# Patient Record
Sex: FEMALE | Race: WHITE | HISPANIC OR LATINO | URBAN - METROPOLITAN AREA
[De-identification: names, ages, dates, MRNs, and addresses within clinical notes are randomized per-mention and may not be internally consistent; named-entity substitution may affect disease eponyms.]

---

## 2020-06-04 ENCOUNTER — OFFICE VISIT (OUTPATIENT)
Dept: URBAN - METROPOLITAN AREA TELEHEALTH 2 | Facility: TELEHEALTH | Age: 55
End: 2020-06-04

## 2020-07-08 ENCOUNTER — OFFICE VISIT (OUTPATIENT)
Dept: URBAN - METROPOLITAN AREA CLINIC 63 | Facility: CLINIC | Age: 55
End: 2020-07-08

## 2020-07-28 ENCOUNTER — LAB OUTSIDE AN ENCOUNTER (OUTPATIENT)
Dept: URBAN - METROPOLITAN AREA CLINIC 121 | Facility: CLINIC | Age: 55
End: 2020-07-28

## 2020-08-18 LAB
% SATURATION: (no result)
FERRITIN: (no result)
FOLATE, SERUM: (no result)
GASTRIN: (no result)
INTRINSIC FACTOR BLOCKING ANTIBODY: NEGATIVE
IRON BINDING CAPACITY: (no result)
IRON, TOTAL: (no result)
Lab: (no result)
Lab: (no result)
VITAMIN B12: (no result)

## 2020-08-31 ENCOUNTER — OFFICE VISIT (OUTPATIENT)
Dept: URBAN - METROPOLITAN AREA CLINIC 63 | Facility: CLINIC | Age: 55
End: 2020-08-31

## 2020-09-01 ENCOUNTER — OFFICE VISIT (OUTPATIENT)
Dept: URBAN - METROPOLITAN AREA CLINIC 121 | Facility: CLINIC | Age: 55
End: 2020-09-01

## 2020-10-21 ENCOUNTER — OFFICE VISIT (OUTPATIENT)
Dept: URBAN - METROPOLITAN AREA CLINIC 63 | Facility: CLINIC | Age: 55
End: 2020-10-21

## 2020-10-26 ENCOUNTER — OFFICE VISIT (OUTPATIENT)
Dept: URBAN - METROPOLITAN AREA CLINIC 121 | Facility: CLINIC | Age: 55
End: 2020-10-26

## 2021-09-03 ENCOUNTER — OFFICE VISIT (OUTPATIENT)
Dept: URBAN - METROPOLITAN AREA CLINIC 63 | Facility: CLINIC | Age: 56
End: 2021-09-03

## 2021-09-23 ENCOUNTER — OFFICE VISIT (OUTPATIENT)
Dept: URBAN - METROPOLITAN AREA SURGERY CENTER 4 | Facility: SURGERY CENTER | Age: 56
End: 2021-09-23

## 2021-09-27 LAB — PATHOLOGY (INDENTED REPORT): (no result)

## 2021-10-25 ENCOUNTER — OFFICE VISIT (OUTPATIENT)
Dept: URBAN - METROPOLITAN AREA CLINIC 63 | Facility: CLINIC | Age: 56
End: 2021-10-25

## 2022-07-09 ENCOUNTER — TELEPHONE ENCOUNTER (OUTPATIENT)
Dept: URBAN - METROPOLITAN AREA CLINIC 121 | Facility: CLINIC | Age: 57
End: 2022-07-09

## 2022-07-09 RX ORDER — SUCRALFATE 1 G/1
TWICE A DAY TABLET ORAL TWICE A DAY
Refills: 2 | OUTPATIENT
Start: 2020-06-04 | End: 2020-10-21

## 2022-07-09 RX ORDER — OMEPRAZOLE 20 MG/1
TWICE A DAY CAPSULE, DELAYED RELEASE ORAL TWICE A DAY
Refills: 0 | OUTPATIENT
Start: 2020-05-14 | End: 2020-06-04

## 2022-07-09 RX ORDER — SUCRALFATE 1 G/1
TWICE A DAY TABLET ORAL TWICE A DAY
Refills: 2 | OUTPATIENT
Start: 2021-06-09 | End: 2021-09-03

## 2022-07-09 RX ORDER — SUCRALFATE 1 G/1
TWICE A DAY TABLET ORAL TWICE A DAY
Refills: 2 | OUTPATIENT
Start: 2020-10-21 | End: 2021-01-19

## 2022-07-09 RX ORDER — OMEPRAZOLE 20 MG/1
TWICE A DAY CAPSULE, DELAYED RELEASE ORAL TWICE A DAY
Refills: 1 | OUTPATIENT
Start: 2020-06-04 | End: 2020-10-06

## 2022-07-09 RX ORDER — SUCRALFATE 1 G/1
TWICE A DAY TABLET ORAL TWICE A DAY
Refills: 2 | OUTPATIENT
Start: 2021-01-19 | End: 2021-04-16

## 2022-07-09 RX ORDER — OMEPRAZOLE 20 MG/1
CAPSULE, DELAYED RELEASE ORAL
Refills: 0 | OUTPATIENT
Start: 2020-05-13 | End: 2020-05-14

## 2022-07-09 RX ORDER — SUCRALFATE 1 G/1
TWICE A DAY TABLET ORAL TWICE A DAY
Refills: 2 | OUTPATIENT
Start: 2021-04-16 | End: 2021-06-09

## 2022-07-09 RX ORDER — GABAPENTIN 100 MG/1
CAPSULE ORAL
Refills: 0 | OUTPATIENT
Start: 2020-05-13 | End: 2020-05-13

## 2022-07-09 RX ORDER — OMEPRAZOLE 20 MG/1
TWICE A DAY CAPSULE, DELAYED RELEASE ORAL TWICE A DAY
Refills: 0 | OUTPATIENT
Start: 2020-05-14 | End: 2020-05-14

## 2022-07-10 ENCOUNTER — TELEPHONE ENCOUNTER (OUTPATIENT)
Dept: URBAN - METROPOLITAN AREA CLINIC 121 | Facility: CLINIC | Age: 57
End: 2022-07-10

## 2022-07-10 RX ORDER — GABAPENTIN 100 MG/1
CAPSULE ORAL THREE TIMES A DAY
Refills: 0 | Status: ACTIVE | COMMUNITY
Start: 2020-05-13

## 2022-07-10 RX ORDER — ACYCLOVIR 800 MG/1
TABLET ORAL TWICE A DAY
Refills: 0 | Status: ACTIVE | COMMUNITY
Start: 2020-05-13

## 2022-07-10 RX ORDER — SUCRALFATE 1 G/1
TWICE A DAY TABLET ORAL TWICE A DAY
Refills: 2 | Status: ACTIVE | COMMUNITY
Start: 2021-09-03

## 2022-07-10 RX ORDER — GABAPENTIN 100 MG
CAPSULE ORAL
Refills: 0 | Status: ACTIVE | COMMUNITY
Start: 2020-05-13

## 2022-07-10 RX ORDER — FAMOTIDINE 20 MG/1
TWICE A DAY TABLET ORAL TWICE A DAY
Refills: 0 | Status: ACTIVE | COMMUNITY
Start: 2021-09-03

## 2022-07-10 RX ORDER — OMEPRAZOLE 20 MG/1
TWICE A DAY CAPSULE, DELAYED RELEASE ORAL TWICE A DAY
Refills: 0 | Status: ACTIVE | COMMUNITY
Start: 2020-10-06

## 2023-11-01 ENCOUNTER — OFFICE VISIT (OUTPATIENT)
Dept: URBAN - METROPOLITAN AREA CLINIC 60 | Facility: CLINIC | Age: 58
End: 2023-11-01

## 2023-11-01 RX ORDER — ACYCLOVIR 800 MG/1
TABLET ORAL TWICE A DAY
Refills: 0 | Status: ACTIVE | COMMUNITY
Start: 2020-05-13

## 2023-11-01 RX ORDER — FAMOTIDINE 20 MG/1
TWICE A DAY TABLET ORAL TWICE A DAY
Refills: 0 | Status: ACTIVE | COMMUNITY
Start: 2021-09-03

## 2023-11-01 RX ORDER — GABAPENTIN 100 MG
CAPSULE ORAL
Refills: 0 | Status: ACTIVE | COMMUNITY
Start: 2020-05-13

## 2023-11-01 RX ORDER — SUCRALFATE 1 G/1
TWICE A DAY TABLET ORAL TWICE A DAY
Refills: 2 | Status: ACTIVE | COMMUNITY
Start: 2021-09-03

## 2023-11-01 RX ORDER — OMEPRAZOLE 20 MG/1
TWICE A DAY CAPSULE, DELAYED RELEASE ORAL TWICE A DAY
Refills: 0 | Status: ACTIVE | COMMUNITY
Start: 2020-10-06

## 2023-11-01 RX ORDER — GABAPENTIN 100 MG/1
CAPSULE ORAL THREE TIMES A DAY
Refills: 0 | Status: ACTIVE | COMMUNITY
Start: 2020-05-13

## 2023-12-01 ENCOUNTER — DASHBOARD ENCOUNTERS (OUTPATIENT)
Age: 58
End: 2023-12-01

## 2023-12-08 ENCOUNTER — OFFICE VISIT (OUTPATIENT)
Dept: URBAN - METROPOLITAN AREA CLINIC 60 | Facility: CLINIC | Age: 58
End: 2023-12-08

## 2023-12-08 RX ORDER — FAMOTIDINE 20 MG/1
TWICE A DAY TABLET ORAL TWICE A DAY
Refills: 0 | Status: ACTIVE | COMMUNITY
Start: 2021-09-03

## 2023-12-08 RX ORDER — ACYCLOVIR 800 MG/1
TABLET ORAL TWICE A DAY
Refills: 0 | Status: ACTIVE | COMMUNITY
Start: 2020-05-13

## 2023-12-08 RX ORDER — GABAPENTIN 100 MG/1
CAPSULE ORAL THREE TIMES A DAY
Refills: 0 | Status: ACTIVE | COMMUNITY
Start: 2020-05-13

## 2023-12-08 RX ORDER — OMEPRAZOLE 20 MG/1
TWICE A DAY CAPSULE, DELAYED RELEASE ORAL TWICE A DAY
Refills: 0 | Status: ACTIVE | COMMUNITY
Start: 2020-10-06

## 2023-12-08 RX ORDER — GABAPENTIN 100 MG
CAPSULE ORAL
Refills: 0 | Status: ACTIVE | COMMUNITY
Start: 2020-05-13

## 2023-12-08 RX ORDER — SUCRALFATE 1 G/1
TWICE A DAY TABLET ORAL TWICE A DAY
Refills: 2 | Status: ACTIVE | COMMUNITY
Start: 2021-09-03

## 2025-06-20 ENCOUNTER — LAB OUTSIDE AN ENCOUNTER (OUTPATIENT)
Dept: URBAN - METROPOLITAN AREA CLINIC 63 | Facility: CLINIC | Age: 60
End: 2025-06-20

## 2025-06-20 ENCOUNTER — OFFICE VISIT (OUTPATIENT)
Dept: URBAN - METROPOLITAN AREA CLINIC 63 | Facility: CLINIC | Age: 60
End: 2025-06-20
Payer: COMMERCIAL

## 2025-06-20 DIAGNOSIS — K22.70 BARRETT'S ESOPHAGUS WITHOUT DYSPLASIA: ICD-10-CM

## 2025-06-20 DIAGNOSIS — K29.50 UNSPECIFIED CHRONIC GASTRITIS WITHOUT BLEEDING: ICD-10-CM

## 2025-06-20 PROCEDURE — 99204 OFFICE O/P NEW MOD 45 MIN: CPT

## 2025-06-20 RX ORDER — GABAPENTIN 100 MG/1
CAPSULE ORAL
Qty: 90 CAPSULE | Status: ACTIVE | COMMUNITY

## 2025-06-20 RX ORDER — FAMOTIDINE 40 MG/1
1 TABLET TABLET, FILM COATED ORAL ONCE A DAY
Qty: 30 | OUTPATIENT
Start: 2025-06-20

## 2025-06-20 RX ORDER — PROPRANOLOL HYDROCHLORIDE 10 MG/1
TAKE ONE TABLET BY MOUTH TWICE DAILY FOR HEADACHE TABLET ORAL
Qty: 60 EACH | Refills: 0 | Status: ACTIVE | COMMUNITY

## 2025-06-20 RX ORDER — SUCRALFATE 1 G/1
TABLET ORAL
Qty: 60 TABLET | Status: ACTIVE | COMMUNITY

## 2025-06-20 RX ORDER — LANSOPRAZOLE 30 MG/1
CAPSULE, DELAYED RELEASE ORAL
Qty: 30 CAPSULE | Status: ACTIVE | COMMUNITY

## 2025-06-20 RX ORDER — ATORVASTATIN CALCIUM 40 MG/1
TAKE ONE TABLET BY MOUTH DAILY TABLET, FILM COATED ORAL
Qty: 30 EACH | Refills: 0 | Status: ACTIVE | COMMUNITY

## 2025-06-20 RX ORDER — DICYCLOMINE HYDROCHLORIDE 20 MG/1
TAKE ONE TABLET BY MOUTH TWICE DAILY TABLET ORAL
Qty: 60 EACH | Refills: 0 | Status: ACTIVE | COMMUNITY

## 2025-06-20 RX ORDER — CYCLOBENZAPRINE HYDROCHLORIDE 10 MG/1
TAKE ONE TABLET BY MOUTH EVERY NIGHT AT BEDTIME TABLET, FILM COATED ORAL
Qty: 90 EACH | Refills: 0 | Status: ACTIVE | COMMUNITY

## 2025-06-20 NOTE — HPI-TODAY'S VISIT:
6/25 This is a 59 years old female patient with past medical history of Daniels esophagus, GERD disease seen in the office visit today in good general state patient reported she started a new job last month in the [place] environment, and she started eating a lot of spicy food, patient reported she started having acid reflux symptoms and heartburn, chest discomfort and associated with ingestion of food, patient reported she discontinued PPI 2 years ago, PCP prescribed sucralfate 1 mL twice a day, patient last colonoscopy was 5 years ago as per patient no polyps were found, will plan to start famotidine 20 mg at bedtime, will give patient about the GERD diet avoid trigger foods, we will plan to do EGD. Patient denies having any chest pain, SOB. Denies history of CAD, CHF, COPD, Stroke. Patient denies undergoing any cardiac evaluation currently.

## 2025-07-11 ENCOUNTER — TELEPHONE ENCOUNTER (OUTPATIENT)
Dept: URBAN - METROPOLITAN AREA CLINIC 63 | Facility: CLINIC | Age: 60
End: 2025-07-11

## 2025-07-11 RX ORDER — FAMOTIDINE 40 MG/1
1 TABLET TABLET, FILM COATED ORAL ONCE A DAY
Qty: 30
Start: 2025-06-20

## 2025-07-19 ENCOUNTER — P2P PATIENT RECORD (OUTPATIENT)
Age: 60
End: 2025-07-19

## 2025-07-25 ENCOUNTER — OFFICE VISIT (OUTPATIENT)
Dept: URBAN - METROPOLITAN AREA SURGERY CENTER 4 | Facility: SURGERY CENTER | Age: 60
End: 2025-07-25
Payer: COMMERCIAL

## 2025-07-25 ENCOUNTER — CLAIMS CREATED FROM THE CLAIM WINDOW (OUTPATIENT)
Dept: URBAN - METROPOLITAN AREA SURGERY CENTER 4 | Facility: SURGERY CENTER | Age: 60
End: 2025-07-25

## 2025-07-25 DIAGNOSIS — K44.9 DIAPHRAGMATIC HERNIA WITHOUT OBSTRUCTION OR GANGRENE: ICD-10-CM

## 2025-07-25 DIAGNOSIS — K29.60 OTHER GASTRITIS WITHOUT BLEEDING: ICD-10-CM

## 2025-07-25 DIAGNOSIS — K22.89 OTHER SPECIFIED DISEASE OF ESOPHAGUS: ICD-10-CM

## 2025-07-25 PROCEDURE — 43239 EGD BIOPSY SINGLE/MULTIPLE: CPT | Performed by: INTERNAL MEDICINE

## 2025-07-25 RX ORDER — GABAPENTIN 100 MG/1
CAPSULE ORAL
Qty: 90 CAPSULE | Status: ACTIVE | COMMUNITY

## 2025-07-25 RX ORDER — PROPRANOLOL HYDROCHLORIDE 10 MG/1
TAKE ONE TABLET BY MOUTH TWICE DAILY FOR HEADACHE TABLET ORAL
Qty: 60 EACH | Refills: 0 | Status: ACTIVE | COMMUNITY

## 2025-07-25 RX ORDER — FAMOTIDINE 40 MG/1
1 TABLET TABLET, FILM COATED ORAL ONCE A DAY
Qty: 30 | Status: ACTIVE | COMMUNITY
Start: 2025-06-20

## 2025-07-25 RX ORDER — ATORVASTATIN CALCIUM 40 MG/1
TAKE ONE TABLET BY MOUTH DAILY TABLET, FILM COATED ORAL
Qty: 30 EACH | Refills: 0 | Status: ACTIVE | COMMUNITY

## 2025-07-25 RX ORDER — CYCLOBENZAPRINE HYDROCHLORIDE 10 MG/1
TAKE ONE TABLET BY MOUTH EVERY NIGHT AT BEDTIME TABLET, FILM COATED ORAL
Qty: 90 EACH | Refills: 0 | Status: ACTIVE | COMMUNITY

## 2025-07-25 RX ORDER — LANSOPRAZOLE 30 MG/1
CAPSULE, DELAYED RELEASE ORAL
Qty: 30 CAPSULE | Status: ACTIVE | COMMUNITY

## 2025-07-25 RX ORDER — SUCRALFATE 1 G/1
TABLET ORAL
Qty: 60 TABLET | Status: ACTIVE | COMMUNITY

## 2025-07-25 RX ORDER — DICYCLOMINE HYDROCHLORIDE 20 MG/1
TAKE ONE TABLET BY MOUTH TWICE DAILY TABLET ORAL
Qty: 60 EACH | Refills: 0 | Status: ACTIVE | COMMUNITY

## 2025-08-08 ENCOUNTER — OFFICE VISIT (OUTPATIENT)
Dept: URBAN - METROPOLITAN AREA CLINIC 63 | Facility: CLINIC | Age: 60
End: 2025-08-08
Payer: COMMERCIAL

## 2025-08-08 DIAGNOSIS — K29.50 UNSPECIFIED CHRONIC GASTRITIS WITHOUT BLEEDING: ICD-10-CM

## 2025-08-08 DIAGNOSIS — K22.70 BARRETT'S ESOPHAGUS WITHOUT DYSPLASIA: ICD-10-CM

## 2025-08-08 PROCEDURE — 99214 OFFICE O/P EST MOD 30 MIN: CPT

## 2025-08-08 RX ORDER — DICYCLOMINE HYDROCHLORIDE 20 MG/1
TAKE ONE TABLET BY MOUTH TWICE DAILY TABLET ORAL
Qty: 60 EACH | Refills: 0 | Status: ACTIVE | COMMUNITY

## 2025-08-08 RX ORDER — FAMOTIDINE 40 MG/1
1 TABLET TABLET, FILM COATED ORAL TWICE A DAY
Qty: 180 TABLET | Refills: 3
Start: 2025-06-20

## 2025-08-08 RX ORDER — SUCRALFATE 1 G/1
TABLET ORAL
Qty: 60 TABLET | Status: ACTIVE | COMMUNITY

## 2025-08-08 RX ORDER — PROPRANOLOL HYDROCHLORIDE 10 MG/1
TAKE ONE TABLET BY MOUTH TWICE DAILY FOR HEADACHE TABLET ORAL
Qty: 60 EACH | Refills: 0 | Status: ACTIVE | COMMUNITY

## 2025-08-08 RX ORDER — GABAPENTIN 100 MG/1
CAPSULE ORAL
Qty: 90 CAPSULE | Status: ACTIVE | COMMUNITY

## 2025-08-08 RX ORDER — CYCLOBENZAPRINE HYDROCHLORIDE 10 MG/1
TAKE ONE TABLET BY MOUTH EVERY NIGHT AT BEDTIME TABLET, FILM COATED ORAL
Qty: 90 EACH | Refills: 0 | Status: ACTIVE | COMMUNITY

## 2025-08-08 RX ORDER — SUCRALFATE 1 G/1
1 TABLET ON AN EMPTY STOMACH TABLET ORAL
Qty: 360 TABLET | Refills: 0

## 2025-08-08 RX ORDER — ATORVASTATIN CALCIUM 40 MG/1
TAKE ONE TABLET BY MOUTH DAILY TABLET, FILM COATED ORAL
Qty: 30 EACH | Refills: 0 | Status: ACTIVE | COMMUNITY

## 2025-08-08 RX ORDER — FAMOTIDINE 40 MG/1
1 TABLET TABLET, FILM COATED ORAL ONCE A DAY
Qty: 30 | Status: ACTIVE | COMMUNITY
Start: 2025-06-20

## 2025-08-12 ENCOUNTER — OFFICE VISIT (OUTPATIENT)
Dept: URBAN - METROPOLITAN AREA SURGERY CENTER 4 | Facility: SURGERY CENTER | Age: 60
End: 2025-08-12

## 2025-08-29 ENCOUNTER — OFFICE VISIT (OUTPATIENT)
Dept: URBAN - METROPOLITAN AREA CLINIC 63 | Facility: CLINIC | Age: 60
End: 2025-08-29